# Patient Record
(demographics unavailable — no encounter records)

---

## 2025-03-18 NOTE — HISTORY OF PRESENT ILLNESS
[de-identified] : Pt. with h/o bilateral SNHL - wears hearing aids, bilateral hearing aids, laryngeal edema, chronic cough, reflux, chronic rhinitis, and pituitary macroadenoma presents today for a follow up stating that he has had a dry cough for the past 5 days. He states that he has essential tremors of his voice and hands. He states that he feels an irritation in his throat that causes him to cough. He denies difficulty swallowing or breathing. He states that this cough has been making it difficult for him to sleep. He states that he takes Famotidine daily.

## 2025-03-18 NOTE — PHYSICAL EXAM
[] : septum deviated to the right [Midline] : trachea located in midline position [Normal] : no rashes [de-identified] : neck soreness in the larynx area when palpating the neck [Hearing Loss Right Only] : normal [Hearing Loss Left Only] : normal [FreeTextEntry8] : cerumen impaction removed via curettage [FreeTextEntry9] : cerumen and dead skin removed via curettage [FreeTextEntry5] : no response to tuning forks [de-identified] : inflamed and swollen turbinates [de-identified] : thick white oral PND [FreeTextEntry2] : sinuses nontender to percussion. sensations intact

## 2025-03-18 NOTE — CONSULT LETTER
[Dear  ___] : Dear  [unfilled], [Courtesy Letter:] : I had the pleasure of seeing your patient, [unfilled], in my office today. [Please see my note below.] : Please see my note below. [Consult Closing:] : Thank you very much for allowing me to participate in the care of this patient.  If you have any questions, please do not hesitate to contact me. [Sincerely,] : Sincerely, [FreeTextEntry3] :  Tim Manriquez MD FACS

## 2025-03-18 NOTE — ASSESSMENT
[FreeTextEntry1] :  Reviewed and reconciled medications, allergies, PMHx, PSHx, SocHx, FMHx.  Pt. with h/o bilateral SNHL - wears hearing aids, bilateral hearing aids, laryngeal edema, chronic cough, reflux, chronic rhinitis, and pituitary macroadenoma presents today for a follow up stating that he has had a dry cough for the past 5 days. He states that he has essential tremors of his voice and hands. He states that he feels an irritation in his throat that causes him to cough. He denies difficulty swallowing or breathing. He states that this cough has been making it difficult for him to sleep. He states that he takes Famotidine daily.  Physical exam: -right ear canal: cerumen impaction removed via curettage -left ear canal: cerumen and dead skin removed via curettage -no response to tuning forks -mildly deviated septum right -inflamed and swollen turbinates -thick white oral PND -neck soreness in the larynx area when palpating the neck  ENT Procedure: Flexible laryngoscopy -thick PND -secretions in the vallecular -edema and erythema of the postcricoid, arytenoids and interarytenoids  Plan: -Start Doxycycline BID -FU in 6 months

## 2025-03-18 NOTE — ADDENDUM
[FreeTextEntry1] :  Documented by Dorian Ruggiero acting as scribe for Dr. Manriquez on 03/18/2025. All Medical record entries made by the Scribe were at my, Dr. Manriquez, direction and personally dictated by me on 03/18/2025 . I have reviewed the chart and agree that the record accurately reflects my personal performance of the history, physical exam, assessment and plan. I have also personally directed, reviewed, and agreed with the discharge instructions.

## 2025-03-18 NOTE — PROCEDURE
[Complicated Symptoms] : complicated symptoms requiring more thorough examination than provided by mirror [de-identified] :  Procedure: Flexible Fiberoptic Laryngoscopy: Risks, benefits, and alternatives of flexible endoscopy were explained to the patient. The patient gave oral consent to proceed. The flexible scope was inserted into the right nasal cavity and advanced towards the nasopharynx. Visualized mucosa over the turbinates and septum were as described above. The nasopharynx was clear. Oropharyngeal walls were symmetric and mobile without lesion, mass, or edema. Hypopharynx was also without lesion or edema. Larynx was mobile without lesions. Supraglottic structures were free of mass and asymmetry, but edema and erythema of the postcricoid, arytenoids and interarytenoids. True vocal folds were white without mass or lesion. Base of tongue was within normal limits. There was a thick PND and secretions in the vallecular. -thick PND -secretions in the vallecular -edema and erythema of the postcricoid, arytenoids and interarytenoids